# Patient Record
Sex: FEMALE
[De-identification: names, ages, dates, MRNs, and addresses within clinical notes are randomized per-mention and may not be internally consistent; named-entity substitution may affect disease eponyms.]

---

## 2017-04-14 ENCOUNTER — MEDICATION RENEWAL (OUTPATIENT)
Age: 42
End: 2017-04-14

## 2017-05-16 ENCOUNTER — APPOINTMENT (OUTPATIENT)
Dept: OBGYN | Facility: CLINIC | Age: 42
End: 2017-05-16

## 2017-05-16 VITALS
BODY MASS INDEX: 19.74 KG/M2 | DIASTOLIC BLOOD PRESSURE: 75 MMHG | HEART RATE: 66 BPM | HEIGHT: 67 IN | SYSTOLIC BLOOD PRESSURE: 124 MMHG | WEIGHT: 125.8 LBS

## 2017-07-06 ENCOUNTER — OTHER (OUTPATIENT)
Age: 42
End: 2017-07-06

## 2018-03-08 ENCOUNTER — APPOINTMENT (OUTPATIENT)
Dept: OBGYN | Facility: CLINIC | Age: 43
End: 2018-03-08
Payer: COMMERCIAL

## 2018-03-08 VITALS
DIASTOLIC BLOOD PRESSURE: 77 MMHG | HEART RATE: 66 BPM | SYSTOLIC BLOOD PRESSURE: 118 MMHG | BODY MASS INDEX: 19.78 KG/M2 | WEIGHT: 126 LBS | HEIGHT: 67 IN

## 2018-03-08 PROCEDURE — 99213 OFFICE O/P EST LOW 20 MIN: CPT

## 2018-03-13 ENCOUNTER — OTHER (OUTPATIENT)
Age: 43
End: 2018-03-13

## 2018-03-14 ENCOUNTER — OTHER (OUTPATIENT)
Age: 43
End: 2018-03-14

## 2018-03-23 LAB
C TRACH RRNA SPEC QL NAA+PROBE: NOT DETECTED
HBV SURFACE AG SER QL: NONREACTIVE
HCV AB SER QL: NONREACTIVE
HCV S/CO RATIO: 0.1 S/CO
HIV1+2 AB SPEC QL IA.RAPID: NONREACTIVE
N GONORRHOEA RRNA SPEC QL NAA+PROBE: NOT DETECTED
SOURCE AMPLIFICATION: NORMAL
T PALLIDUM AB SER QL IA: NEGATIVE

## 2018-04-04 RX ORDER — AZITHROMYCIN 500 MG/1
500 TABLET, FILM COATED ORAL
Qty: 2 | Refills: 0 | Status: COMPLETED | COMMUNITY
Start: 2018-03-08 | End: 2018-04-04

## 2018-06-15 ENCOUNTER — APPOINTMENT (OUTPATIENT)
Dept: OBGYN | Facility: CLINIC | Age: 43
End: 2018-06-15
Payer: COMMERCIAL

## 2018-06-15 VITALS
SYSTOLIC BLOOD PRESSURE: 108 MMHG | HEART RATE: 64 BPM | HEIGHT: 67 IN | WEIGHT: 124 LBS | DIASTOLIC BLOOD PRESSURE: 68 MMHG | BODY MASS INDEX: 19.46 KG/M2

## 2018-06-15 DIAGNOSIS — Z20.2 CONTACT WITH AND (SUSPECTED) EXPOSURE TO INFECTIONS WITH A PREDOMINANTLY SEXUAL MODE OF TRANSMISSION: ICD-10-CM

## 2018-06-15 DIAGNOSIS — Z30.9 ENCOUNTER FOR CONTRACEPTIVE MANAGEMENT, UNSPECIFIED: ICD-10-CM

## 2018-06-15 DIAGNOSIS — B37.3 CANDIDIASIS OF VULVA AND VAGINA: ICD-10-CM

## 2018-06-15 PROCEDURE — 99396 PREV VISIT EST AGE 40-64: CPT

## 2018-06-15 RX ORDER — FLUCONAZOLE 150 MG/1
150 TABLET ORAL
Qty: 1 | Refills: 1 | Status: COMPLETED | COMMUNITY
Start: 2018-04-04 | End: 2018-06-15

## 2018-07-09 LAB
CYTOLOGY CVX/VAG DOC THIN PREP: NORMAL
HPV HIGH+LOW RISK DNA PNL CVX: DETECTED

## 2018-07-10 ENCOUNTER — OTHER (OUTPATIENT)
Age: 43
End: 2018-07-10

## 2019-03-12 ENCOUNTER — APPOINTMENT (OUTPATIENT)
Dept: OBGYN | Facility: CLINIC | Age: 44
End: 2019-03-12
Payer: COMMERCIAL

## 2019-03-12 VITALS
HEART RATE: 62 BPM | SYSTOLIC BLOOD PRESSURE: 109 MMHG | WEIGHT: 123 LBS | DIASTOLIC BLOOD PRESSURE: 73 MMHG | HEIGHT: 67 IN | BODY MASS INDEX: 19.3 KG/M2

## 2019-03-12 DIAGNOSIS — R92.2 INCONCLUSIVE MAMMOGRAM: ICD-10-CM

## 2019-03-12 PROCEDURE — 99213 OFFICE O/P EST LOW 20 MIN: CPT

## 2019-03-29 ENCOUNTER — APPOINTMENT (OUTPATIENT)
Dept: MAMMOGRAPHY | Facility: CLINIC | Age: 44
End: 2019-03-29
Payer: COMMERCIAL

## 2019-03-29 ENCOUNTER — OUTPATIENT (OUTPATIENT)
Dept: OUTPATIENT SERVICES | Facility: HOSPITAL | Age: 44
LOS: 1 days | End: 2019-03-29

## 2019-03-29 ENCOUNTER — APPOINTMENT (OUTPATIENT)
Dept: ULTRASOUND IMAGING | Facility: CLINIC | Age: 44
End: 2019-03-29
Payer: COMMERCIAL

## 2019-03-29 PROCEDURE — G0279: CPT | Mod: 26

## 2019-03-29 PROCEDURE — 77065 DX MAMMO INCL CAD UNI: CPT | Mod: 26,RT

## 2019-03-29 PROCEDURE — 76642 ULTRASOUND BREAST LIMITED: CPT | Mod: 26,RT

## 2019-04-04 ENCOUNTER — APPOINTMENT (OUTPATIENT)
Dept: OBGYN | Facility: CLINIC | Age: 44
End: 2019-04-04
Payer: COMMERCIAL

## 2019-04-04 VITALS
BODY MASS INDEX: 19.16 KG/M2 | SYSTOLIC BLOOD PRESSURE: 111 MMHG | HEART RATE: 62 BPM | WEIGHT: 122.06 LBS | DIASTOLIC BLOOD PRESSURE: 73 MMHG | HEIGHT: 67 IN

## 2019-04-04 DIAGNOSIS — Z01.419 ENCOUNTER FOR GYNECOLOGICAL EXAMINATION (GENERAL) (ROUTINE) W/OUT ABNORMAL FINDINGS: ICD-10-CM

## 2019-04-04 DIAGNOSIS — Z12.31 ENCOUNTER FOR SCREENING MAMMOGRAM FOR MALIGNANT NEOPLASM OF BREAST: ICD-10-CM

## 2019-04-04 DIAGNOSIS — N64.9 DISORDER OF BREAST, UNSPECIFIED: ICD-10-CM

## 2019-04-04 PROCEDURE — 99396 PREV VISIT EST AGE 40-64: CPT

## 2019-04-05 ENCOUNTER — TRANSCRIPTION ENCOUNTER (OUTPATIENT)
Age: 44
End: 2019-04-05

## 2019-04-18 LAB
CYTOLOGY CVX/VAG DOC THIN PREP: NORMAL
HPV HIGH+LOW RISK DNA PNL CVX: NOT DETECTED

## 2019-07-02 PROBLEM — N64.9 BREAST LESION: Status: RESOLVED | Noted: 2019-03-12 | Resolved: 2019-07-02

## 2019-07-02 NOTE — PHYSICAL EXAM
[Awake] : awake [Alert] : alert [Acute Distress] : no acute distress [Thyroid Nodule] : no thyroid nodule [LAD] : no lymphadenopathy [Goiter] : no goiter [Mass] : no breast mass [Nipple Discharge] : no nipple discharge [Axillary LAD] : no axillary lymphadenopathy [Soft] : soft [Tender] : non tender [Distended] : not distended [None] : no CVA tenderness [Depressed Mood] : not depressed [Oriented x3] : oriented to person, place, and time [Normal] : uterus [No Bleeding] : there was no active vaginal bleeding [Anteversion] : anteverted [Tenderness] : nontender [Uterine Adnexae] : were not tender and not enlarged

## 2019-07-02 NOTE — HISTORY OF PRESENT ILLNESS
[1 Year Ago] : 1 year ago [Good] : being in good health [Last Pap Smear ___] : last Papanicolaou cytology done [unfilled] [Last Mammogram ___] : last mammogram done [unfilled] [Reproductive Age] : is of reproductive age [HPV Vaccine NA Due to Age] : HPV vaccine not available to patient due to age [Sexually Active] : is sexually active [Monogamous] : is monogamous [Male ___] : [unfilled] male [NA] : N/A